# Patient Record
Sex: FEMALE
[De-identification: names, ages, dates, MRNs, and addresses within clinical notes are randomized per-mention and may not be internally consistent; named-entity substitution may affect disease eponyms.]

---

## 2017-05-22 ENCOUNTER — HOSPITAL ENCOUNTER (OUTPATIENT)
Dept: HOSPITAL 5 - MAMMO | Age: 57
Discharge: HOME | End: 2017-05-22
Attending: OBSTETRICS & GYNECOLOGY
Payer: COMMERCIAL

## 2017-05-22 DIAGNOSIS — Z12.31: Primary | ICD-10-CM

## 2017-05-22 PROCEDURE — G0202 SCR MAMMO BI INCL CAD: HCPCS

## 2017-05-22 PROCEDURE — 77067 SCR MAMMO BI INCL CAD: CPT

## 2017-05-23 NOTE — MAMMOGRAPHY REPORT
Bilateral mammogram: Compared to 5/31/13. CAD study utilized.



Findings:



Predominantly fatty breast. No mass or microcalcifications. Normal 

axilla.



Impression:



Essentially negative mammogram. BI-RADS CATEGORY:  2 = Benign



ACR BI-RADS MAMMOGRAPHIC CODES:

0 = Needs additional imaging evaluation; 1 = Negative; 2 = Benign; 3 = 

Probably benign; 4 = Suspicious; 5 = Malignant; 6 = Known biopsy-proven 

malignancy



COMMENT:

      1.   Dense breast tissue, i.e., adenosis, fibrocystic 

            changes, etc., may obscure an underlying neoplasm.

      2.   Approximately 10% of cancers are not detected with

            mammography.

      3.   A negative mammography report should not delay biopsy 

            if a clinically suspicious mass is present. COMMENT:

Patient follow-up letters are generated in [employer].

## 2018-07-23 ENCOUNTER — HOSPITAL ENCOUNTER (OUTPATIENT)
Dept: HOSPITAL 5 - MAMMO | Age: 58
Discharge: HOME | End: 2018-07-23
Attending: OBSTETRICS & GYNECOLOGY
Payer: COMMERCIAL

## 2018-07-23 DIAGNOSIS — Z88.1: ICD-10-CM

## 2018-07-23 DIAGNOSIS — Z12.31: Primary | ICD-10-CM

## 2018-07-23 PROCEDURE — 77067 SCR MAMMO BI INCL CAD: CPT

## 2018-07-23 NOTE — MAMMOGRAPHY REPORT
BILATERAL DIGITAL SCREENING MAMMOGRAM with CAD: 07/23/18 09:07:00



CLINICAL: Routine screening.



COMPARISON:03/22/17 and 03/31/13



FINDINGS: The breasts are mostly fatty with a few residual bilateral 

retroareolar fibroglandular densities.  A right retroareolar asymmetry 

on the CC view requires additional imaging.No architectural distortion 

or suspicious calcifications.The left breast is negative.



IMPRESSION: Right asymmetry requiring further workup.



BI-RADS CATEGORY:  0 -- Additional Imaging Evaluation Required



RECOMMENDATION: Recall for right mediolateral and CC spot mag 

magnification nipple views and right breast ultrasound if needed.



ACR BI-RADS MAMMOGRAPHIC CODES:

0 = Needs additional imaging evaluation; 1 = Negative; 2 = Benign; 3 = 

Probably benign; 4 = Suspicious; 5 = Malignant; 6 = Known biopsy-proven 

malignancy



COMMENT:

      1.   Dense breast tissue, i.e., adenosis, fibrocystic 

            changes, etc., may obscure an underlying neoplasm.

      2.   Approximately 10% of cancers are not detected with

            mammography.

      3.   A negative mammography report should not delay biopsy 

            if a clinically suspicious mass is present.



COMMENT:

Patient follow-up letters are generated via our [employer] application.

## 2018-08-06 ENCOUNTER — HOSPITAL ENCOUNTER (OUTPATIENT)
Dept: HOSPITAL 5 - MAMMO | Age: 58
Discharge: HOME | End: 2018-08-06
Attending: OBSTETRICS & GYNECOLOGY
Payer: COMMERCIAL

## 2018-08-06 DIAGNOSIS — Z88.1: ICD-10-CM

## 2018-08-06 DIAGNOSIS — N64.89: Primary | ICD-10-CM

## 2018-08-06 NOTE — MAMMOGRAPHY REPORT
RIGHT DIGITAL DIAGNOSTIC MAMMOGRAM : 08/06/18 08:22:00



CLINICAL: Recalled for asymmetry.



COMPARISON:07/23/18 screening



FINDINGS: Additional mammographic views were performed and are negative.



IMPRESSION: Negative Mammogram.



BI-RADS CATEGORY:  1 -- Negative



RECOMMENDATION: Routine mammographic screening in one year.



ACR BI-RADS MAMMOGRAPHIC CODES:

0 = Needs additional imaging evaluation; 1 = Negative; 2 = Benign; 3 = 

Probably benign; 4 = Suspicious; 5 = Malignant; 6 = Known biopsy-proven 

malignancy



COMMENT:

      1.   Dense breast tissue, i.e., adenosis, fibrocystic 

            changes, etc., may obscure an underlying neoplasm.

      2.   Approximately 10% of cancers are not detected with

            mammography.

      3.   A negative mammography report should not delay biopsy 

            if a clinically suspicious mass is present.





COMMENT:

Patient follow-up letters are generated via our [employer] 

application.